# Patient Record
Sex: MALE | Race: OTHER | HISPANIC OR LATINO | URBAN - METROPOLITAN AREA
[De-identification: names, ages, dates, MRNs, and addresses within clinical notes are randomized per-mention and may not be internally consistent; named-entity substitution may affect disease eponyms.]

---

## 2017-04-16 ENCOUNTER — EMERGENCY (EMERGENCY)
Facility: HOSPITAL | Age: 37
LOS: 0 days | Discharge: HOME | End: 2017-04-16
Attending: EMERGENCY MEDICINE | Admitting: EMERGENCY MEDICINE
Payer: MEDICAID

## 2017-04-16 VITALS
OXYGEN SATURATION: 100 % | DIASTOLIC BLOOD PRESSURE: 102 MMHG | HEART RATE: 90 BPM | HEIGHT: 72 IN | RESPIRATION RATE: 17 BRPM | SYSTOLIC BLOOD PRESSURE: 151 MMHG | WEIGHT: 210.1 LBS | TEMPERATURE: 98 F
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
## 2017-04-16 DIAGNOSIS — S89.82XA OTHER SPECIFIED INJURIES OF LEFT LOWER LEG, INITIAL ENCOUNTER: ICD-10-CM

## 2017-04-16 DIAGNOSIS — S61.432A PUNCTURE WOUND WITHOUT FOREIGN BODY OF LEFT HAND, INITIAL ENCOUNTER: ICD-10-CM
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
## 2017-04-16 PROCEDURE — 73130 X-RAY EXAM OF HAND: CPT | Mod: 26,LT

## 2017-04-16 PROCEDURE — 99284 EMERGENCY DEPT VISIT MOD MDM: CPT

## 2017-04-16 RX ORDER — TETANUS TOXOID, REDUCED DIPHTHERIA TOXOID AND ACELLULAR PERTUSSIS VACCINE, ADSORBED 5; 2.5; 8; 8; 2.5 [IU]/.5ML; [IU]/.5ML; UG/.5ML; UG/.5ML; UG/.5ML
0.5 SUSPENSION INTRAMUSCULAR ONCE
Qty: 0 | Refills: 0 | Status: COMPLETED | OUTPATIENT
Start: 2017-04-16 | End: 2017-04-16

## 2017-04-16 RX ADMIN — TETANUS TOXOID, REDUCED DIPHTHERIA TOXOID AND ACELLULAR PERTUSSIS VACCINE, ADSORBED 0.5 MILLILITER(S): 5; 2.5; 8; 8; 2.5 SUSPENSION INTRAMUSCULAR at 10:49

## 2017-04-16 NOTE — ED STATDOCS - PROGRESS NOTE DETAILS
Patient seen and evaluated, no fx or FB on xray, punctura to thenar eminence of L hand.  FROM and strength to hand, sensation intact, brisk capillary refill.  Wound irrigated copiously with NS and betadine, bacitracin and dressing applied, wound care reviewed -Umer Campoverde PA-C

## 2017-04-16 NOTE — ED STATDOCS - DETAILS:
Dr. Keating: I performed the initial face to face bedside interview with this patient regarding history of present illness, review of symptoms and past medical, social and family history.  I completed an independent physical examination.  I was the initial provider who evaluated this patient.  The history, review of symptoms and examination was documented by the scribe in my presence and I attest to the accuracy of the documentation.  I have signed out the follow up of any pending tests (i.e. labs, radiological studies) to the ACP.  I have discussed the patient’s plan of care and disposition with the ACP.  Return to the ER immediately for any worsening symptoms, concerns, chest pain, fevers, shortness of breath, vomiting, abdominal pain, rashes, neck pain, back pain, numbness, paresthesias, pain or any difficulties at all.  Please follow up with your own private physician or our medical clinic at [phone] in the next 2-3 days.  Find a doctor at [phone].

## 2017-04-16 NOTE — ED STATDOCS - CHPI ED SYMPTOM NEG
no blood in stool/no dysuria/no burning urination/no palpitations/no vomiting/no nausea/no diarrhea/no abdominal distention/no fever/no hematuria

## 2017-04-16 NOTE — ED STATDOCS - OBJECTIVE STATEMENT
37 y/o M No previous history or drink etoh, drug use or smoke, presents to the ED s/p left hand injury. The pt provides that he was operating a screw gun and injured the palmar surface of his left hand about 1 hour ago. No h/o foreign body, other trauma, abd pain, nvd, headache, fever, chills, dizziness, cp or cough.  was contacted but was not able to connect, pt was able to give some history.

## 2017-04-16 NOTE — ED STATDOCS - NS ED MD SCRIBE ATTENDING SCRIBE SECTIONS
PROGRESS NOTE/RESULTS/REVIEW OF SYSTEMS/PAST MEDICAL/SURGICAL/SOCIAL HISTORY/DISPOSITION/HISTORY OF PRESENT ILLNESS/HIV/PHYSICAL EXAM/INTAKE ASSESSMENT/SCREENINGS/VITAL SIGNS( Pullset)/CONSULTATIONS/SHIFT CHANGE

## 2023-01-04 ENCOUNTER — EMERGENCY (EMERGENCY)
Facility: HOSPITAL | Age: 43
LOS: 0 days | Discharge: HOME | End: 2023-01-04
Attending: EMERGENCY MEDICINE
Payer: MEDICAID

## 2023-01-04 VITALS
DIASTOLIC BLOOD PRESSURE: 99 MMHG | SYSTOLIC BLOOD PRESSURE: 152 MMHG | RESPIRATION RATE: 18 BRPM | OXYGEN SATURATION: 100 % | TEMPERATURE: 99 F | HEART RATE: 79 BPM

## 2023-01-04 VITALS — HEIGHT: 66 IN | WEIGHT: 199.96 LBS

## 2023-01-04 DIAGNOSIS — K04.7 PERIAPICAL ABSCESS WITHOUT SINUS: ICD-10-CM

## 2023-01-04 DIAGNOSIS — R51.9 HEADACHE, UNSPECIFIED: ICD-10-CM

## 2023-01-04 DIAGNOSIS — R22.0 LOCALIZED SWELLING, MASS AND LUMP, HEAD: ICD-10-CM

## 2023-01-04 PROCEDURE — 99283 EMERGENCY DEPT VISIT LOW MDM: CPT

## 2023-01-04 PROCEDURE — 99284 EMERGENCY DEPT VISIT MOD MDM: CPT

## 2023-01-04 RX ORDER — IBUPROFEN 200 MG
600 TABLET ORAL ONCE
Refills: 0 | Status: COMPLETED | OUTPATIENT
Start: 2023-01-04 | End: 2023-01-04

## 2023-01-04 RX ORDER — IBUPROFEN 200 MG
1 TABLET ORAL
Qty: 16 | Refills: 0
Start: 2023-01-04 | End: 2023-01-07

## 2023-01-04 RX ADMIN — Medication 600 MILLIGRAM(S): at 19:50

## 2023-01-04 RX ADMIN — Medication 1 TABLET(S): at 19:50

## 2023-01-04 NOTE — ED STATDOCS - NS ED ROS FT
Gen: No fever, normal appetite  Eyes: No eye irritation or discharge  ENT: facial swelling   Resp: No cough or trouble breathing  Cardiovascular: No chest pain or palpitation  Gastroenteric: No nausea/vomiting, diarrhea, constipation  :  No change in urine output; no dysuria  MS: No joint or muscle pain  Skin: No rashes  Neuro: No headache; no abnormal movements  Remainder negative, except as per the HPI

## 2023-01-04 NOTE — ED ADULT TRIAGE NOTE - CHIEF COMPLAINT QUOTE
Pt presents to ER c/o right sided dental pain and right sided facial swelling. Pt reports symptoms began  3 days PTA and became exacerbated today. Denies fever/chills

## 2023-01-04 NOTE — ED STATDOCS - PHYSICAL EXAMINATION
Gen: Well appearing in NAD  Head: NC/AT  ENT right upper molar w/ TTP on gumline w/ slight swelling   Neck: trachea midline  Resp:  No distress  Ext: no deformities  Neuro:  A&O appears non focal  Skin:  Warm and dry as visualized  Psych:  Normal affect and mood

## 2023-01-04 NOTE — ED STATDOCS - NS ED ATTENDING STATEMENT MOD
This was a shared visit with the PAM. I reviewed and verified the documentation and independently performed the documented: Attending Only

## 2023-01-04 NOTE — ED STATDOCS - NSFOLLOWUPINSTRUCTIONS_ED_ALL_ED_FT
Dolor dental    Dental Pain       El dolor dental es a menudo un signo de que sucede algo en los dientes o las encías. También es algo que puede ocurrir después de un tratamiento dental. Si tiene dolor dental, es importante que se ponga en contacto con lynch dentista, especialmente si no se ha determinado la causa del dolor. La intensidad del dolor dental puede variar y machelle causas pueden ser muchas, entre ellas, las siguientes:  •Caries. Las caries son causadas por bacterias que producen ácidos que irritan el nervio del diente, volviéndolo sensible al aire y a las temperaturas altas o bajas. [place] con el tiempo provoca molestias o dolor.      •Infección o absceso. Nahid vez que las bacterias llegan a la parte interna del diente (pulpa), puede producirse nahid infección bacteriana (absceso dental). El pus suele acumularse en el extremo de la raíz de un diente.      •Lesiones.      •Nahid grieta en el diente.      •Retracción de las encías que expone la raíz y posiblemente los nervios de un diente.      •Enfermedad en las encías (periodontal).      •Apretar o rechinar los dientes de forma anormal.      •Cuidado deficiente o inadecuado en el hogar.      •Nahid razón desconocida (idiopática).      El dolor puede ser leve o intenso. Puede ocurrir cuando usted:  •[place].      •Está expuesto a temperaturas calientes o frías.      •Come alimentos o massimo bebidas con azúcar, por ejemplo, gaseosas o caramelos.      El dolor puede ser rajan, o puede aparecer y desaparecer sin ninguna causa.      Siga estas instrucciones en lynch casa:    Las siguientes medidas pueden servir para aliviar cualquier molestia que esté sintiendo antes o después de recibir atención dental.    Medicamentos     •[place] los medicamentos de venta sydnee y los recetados solamente khoa se lo haya indicado el dentista.      •Si le recetaron un antibiótico, tómelo khoa se lo haya indicado el dentista. No deje de massimo los antibióticos aunque comience a sentirse mejor.      Comida y bebida     Evite los alimentos o las bebidas que le causen dolor, khoa los siguientes:  •Alimentos o bebidas muy calientes o muy fríos.      •Alimentos o bebidas dulces o con azúcar.        Control del dolor y la hinchazón    •El hielo a veces se puede usar para reducir el dolor y la hinchazón, especialmente si el dolor aparece después de un tratamiento dental.    •Si se lo indican, aplique hielo sobre la janel dolorida de la bianca. Para hacer esto:  •Ponga el hielo en nahid bolsa plástica.      •Coloque nahid toalla entre la piel y la bolsa.      •Aplique el hielo norma 20 minutos, 2 o 3 veces por día.      •Retire el hielo si la piel se pone de color luna brillante. [place] es muy importante. Si no puede sentir dolor, calor o frío, tiene un mayor riesgo de que se dañe la janel.        Cepillarse los dientes    •Para mantener la boca y las encías sanas, cepíllese los dientes dos veces por día con nahid pasta dental con flúor.      •Use nahid pasta dental para dientes sensibles khoa se lo haya indicado el dentista, especialmente si la raíz está expuesta.      •Cepíllese siempre los dientes con un cepillo de cerdas suaves. [place] ayudará a evitar la irritación de las encías.      Instrucciones generales    •Use hilo dental al menos nahid vez al día.      •No se aplique calor en la parte externa de la bianca.      •Brenna gárgaras con nahid mezcla de agua y sal 3 o 4 veces al día, o según sea necesario. Para preparar agua con sal, disuelva totalmente de ½ a 1 cucharadita (de 3 a 6 g) de sal en 1 taza (237 ml) de agua tibia.      •Concurra a todas las visitas de seguimiento. [place] es importante.        Comuníquese con un dentista si:    •Padece algún dolor dental inexplicable.      •El dolor no se ilir con los medicamentos.      •Machelle síntomas empeoran.      •Aparecen nuevos síntomas.        Solicite ayuda de inmediato si:    •No puede abrir la boca.      •Tiene problemas para respirar o tragar.      •Tiene fiebre.      •Observa que tiene hinchazón en la bianca, el aviva o la mandíbula.      Estos síntomas pueden representar un problema grave que constituye nahid emergencia. No espere a chao si los síntomas desaparecen. Solicite atención médica de inmediato. Comuníquese con el servicio de emergencias de lynch localidad (911 en los [place]). No conduzca por machelle propios medios hasta el hospital.       Resumen    •Las causas del dolor dental pueden ser muchas, entre ellas, nahid caries y nahid infección.      •El dolor puede ser leve o intenso.      •[place] los medicamentos de venta sydnee y los recetados solamente khoa se lo haya indicado el dentista.      •Controle el dolor dental a fin de detectar algún cambio. Informe a lynch dentista si los síntomas empeoran.      Esta información no tiene khoa fin reemplazar el consejo del médico. Asegúrese de hacerle al médico cualquier pregunta que tenga.      Document Revised: 10/12/2021 Document Reviewed: 10/12/2021    Elsevier Patient Education © 2022 Elsevier Inc.

## 2023-01-04 NOTE — ED STATDOCS - PATIENT PORTAL LINK FT
You can access the FollowMyHealth Patient Portal offered by [facility] by registering at the following website: [email or web address]. By joining [employer]’s FollowMyHealth portal, you will also be able to view your health information using other applications (apps) compatible with our system.

## 2023-01-04 NOTE — ED STATDOCS - OBJECTIVE STATEMENT
[id number]-  ID     41yo m without significant past medical history p/w right facial swelling and pain x 3 days. no fevers. pt took tylenol w/o relief. has a dentist. can see the patient monday.

## 2023-02-01 NOTE — ED ADULT NURSE NOTE - NS ED NURSE LEVEL OF CONSCIOUSNESS ORIENTATION
Use medications as directed.  Side effects discussed including increased sugars with prednisone, jitteriness, difficult time sleeping.    Increase fluids, rest, warm compresses over sinuses, nasal saline sprays, cool humidifier, Tylenol and ibuprofen over-the-counter as long as no allergies or contraindications.    No antibiotics indicated at this time.  Rapid strep, COVID and influenza today were negative.  Throat culture sent and currently pending.    Symptoms can last 1 to 2 weeks with viral illnesses however he should start noticing some improvement over the next few days.    Go to the emergency department symptoms worsen or change.   Oriented - self; Oriented - place; Oriented - time

## 2023-03-31 ENCOUNTER — EMERGENCY (EMERGENCY)
Facility: HOSPITAL | Age: 43
LOS: 0 days | Discharge: HOME | End: 2023-04-01
Attending: HOSPITALIST
Payer: MEDICAID

## 2023-03-31 VITALS
OXYGEN SATURATION: 98 % | RESPIRATION RATE: 19 BRPM | SYSTOLIC BLOOD PRESSURE: 165 MMHG | HEART RATE: 105 BPM | DIASTOLIC BLOOD PRESSURE: 121 MMHG | TEMPERATURE: 98 F

## 2023-03-31 DIAGNOSIS — S09.90XA UNSPECIFIED INJURY OF HEAD, INITIAL ENCOUNTER: ICD-10-CM

## 2023-03-31 DIAGNOSIS — F10.129 ALCOHOL ABUSE WITH INTOXICATION, UNSPECIFIED: ICD-10-CM
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
## 2023-03-31 DIAGNOSIS — Z20.822 CONTACT WITH AND (SUSPECTED) EXPOSURE TO COVID-19: ICD-10-CM
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
## 2023-03-31 LAB
ALBUMIN SERPL ELPH-MCNC: 4 G/DL — SIGNIFICANT CHANGE UP (ref 3.3–5)
ALP SERPL-CCNC: 103 U/L — SIGNIFICANT CHANGE UP (ref 40–120)
ALT FLD-CCNC: 69 U/L — SIGNIFICANT CHANGE UP (ref 12–78)
ANION GAP SERPL CALC-SCNC: 6 MMOL/L — SIGNIFICANT CHANGE UP (ref 5–17)
APTT BLD: 29.5 SEC — SIGNIFICANT CHANGE UP (ref 27.5–35.5)
AST SERPL-CCNC: 35 U/L — SIGNIFICANT CHANGE UP (ref 15–37)
BASOPHILS # BLD AUTO: 0.08 K/UL — SIGNIFICANT CHANGE UP (ref 0–0.2)
BASOPHILS NFR BLD AUTO: 0.7 % — SIGNIFICANT CHANGE UP (ref 0–2)
BILIRUB SERPL-MCNC: 0.2 MG/DL — SIGNIFICANT CHANGE UP (ref 0.2–1.2)
BUN SERPL-MCNC: 18 MG/DL — SIGNIFICANT CHANGE UP (ref 7–23)
CALCIUM SERPL-MCNC: 8.7 MG/DL — SIGNIFICANT CHANGE UP (ref 8.5–10.1)
CHLORIDE SERPL-SCNC: 107 MMOL/L — SIGNIFICANT CHANGE UP (ref 96–108)
CK SERPL-CCNC: 130 U/L — SIGNIFICANT CHANGE UP (ref 26–308)
CO2 SERPL-SCNC: 28 MMOL/L — SIGNIFICANT CHANGE UP (ref 22–31)
CREAT SERPL-MCNC: 0.83 MG/DL — SIGNIFICANT CHANGE UP (ref 0.5–1.3)
EGFR: 112 ML/MIN/1.73M2 — SIGNIFICANT CHANGE UP
EOSINOPHIL # BLD AUTO: 0.01 K/UL — SIGNIFICANT CHANGE UP (ref 0–0.5)
EOSINOPHIL NFR BLD AUTO: 0.1 % — SIGNIFICANT CHANGE UP (ref 0–6)
ETHANOL SERPL-MCNC: 445 MG/DL — HIGH (ref 0–10)
FLUAV AG NPH QL: SIGNIFICANT CHANGE UP
FLUBV AG NPH QL: SIGNIFICANT CHANGE UP
GLUCOSE BLDC GLUCOMTR-MCNC: 96 MG/DL — SIGNIFICANT CHANGE UP (ref 70–99)
GLUCOSE SERPL-MCNC: 124 MG/DL — HIGH (ref 70–99)
HCT VFR BLD CALC: 42.9 % — SIGNIFICANT CHANGE UP (ref 39–50)
HGB BLD-MCNC: 14.8 G/DL — SIGNIFICANT CHANGE UP (ref 13–17)
IMM GRANULOCYTES NFR BLD AUTO: 0.3 % — SIGNIFICANT CHANGE UP (ref 0–0.9)
INR BLD: 1.05 RATIO — SIGNIFICANT CHANGE UP (ref 0.88–1.16)
LACTATE SERPL-SCNC: 1.9 MMOL/L — SIGNIFICANT CHANGE UP (ref 0.7–2)
LIDOCAIN IGE QN: 179 U/L — SIGNIFICANT CHANGE UP (ref 73–393)
LYMPHOCYTES # BLD AUTO: 27.1 % — SIGNIFICANT CHANGE UP (ref 13–44)
LYMPHOCYTES # BLD AUTO: 3.01 K/UL — SIGNIFICANT CHANGE UP (ref 1–3.3)
MCHC RBC-ENTMCNC: 29.8 PG — SIGNIFICANT CHANGE UP (ref 27–34)
MCHC RBC-ENTMCNC: 34.5 GM/DL — SIGNIFICANT CHANGE UP (ref 32–36)
MCV RBC AUTO: 86.3 FL — SIGNIFICANT CHANGE UP (ref 80–100)
MONOCYTES # BLD AUTO: 0.43 K/UL — SIGNIFICANT CHANGE UP (ref 0–0.9)
MONOCYTES NFR BLD AUTO: 3.9 % — SIGNIFICANT CHANGE UP (ref 2–14)
NEUTROPHILS # BLD AUTO: 7.54 K/UL — HIGH (ref 1.8–7.4)
NEUTROPHILS NFR BLD AUTO: 67.9 % — SIGNIFICANT CHANGE UP (ref 43–77)
PLATELET # BLD AUTO: 343 K/UL — SIGNIFICANT CHANGE UP (ref 150–400)
POTASSIUM SERPL-MCNC: 4 MMOL/L — SIGNIFICANT CHANGE UP (ref 3.5–5.3)
POTASSIUM SERPL-SCNC: 4 MMOL/L — SIGNIFICANT CHANGE UP (ref 3.5–5.3)
PROT SERPL-MCNC: 8.1 GM/DL — SIGNIFICANT CHANGE UP (ref 6–8.3)
PROTHROM AB SERPL-ACNC: 12.2 SEC — SIGNIFICANT CHANGE UP (ref 10.5–13.4)
RBC # BLD: 4.97 M/UL — SIGNIFICANT CHANGE UP (ref 4.2–5.8)
RBC # FLD: 12.9 % — SIGNIFICANT CHANGE UP (ref 10.3–14.5)
RSV RNA NPH QL NAA+NON-PROBE: SIGNIFICANT CHANGE UP
SARS-COV-2 RNA SPEC QL NAA+PROBE: SIGNIFICANT CHANGE UP
SODIUM SERPL-SCNC: 141 MMOL/L — SIGNIFICANT CHANGE UP (ref 135–145)
WBC # BLD: 11.1 K/UL — HIGH (ref 3.8–10.5)
WBC # FLD AUTO: 11.1 K/UL — HIGH (ref 3.8–10.5)

## 2023-03-31 PROCEDURE — 82550 ASSAY OF CK (CPK): CPT

## 2023-03-31 PROCEDURE — 80053 COMPREHEN METABOLIC PANEL: CPT

## 2023-03-31 PROCEDURE — 71260 CT THORAX DX C+: CPT | Mod: 26,MA

## 2023-03-31 PROCEDURE — 74177 CT ABD & PELVIS W/CONTRAST: CPT | Mod: MA

## 2023-03-31 PROCEDURE — 99291 CRITICAL CARE FIRST HOUR: CPT

## 2023-03-31 PROCEDURE — 70450 CT HEAD/BRAIN W/O DYE: CPT | Mod: 26,MA

## 2023-03-31 PROCEDURE — 85610 PROTHROMBIN TIME: CPT

## 2023-03-31 PROCEDURE — 72125 CT NECK SPINE W/O DYE: CPT | Mod: MA

## 2023-03-31 PROCEDURE — 80307 DRUG TEST PRSMV CHEM ANLYZR: CPT

## 2023-03-31 PROCEDURE — 83690 ASSAY OF LIPASE: CPT

## 2023-03-31 PROCEDURE — 71260 CT THORAX DX C+: CPT | Mod: MA

## 2023-03-31 PROCEDURE — 74177 CT ABD & PELVIS W/CONTRAST: CPT | Mod: 26,MA

## 2023-03-31 PROCEDURE — 83605 ASSAY OF LACTIC ACID: CPT

## 2023-03-31 PROCEDURE — 36415 COLL VENOUS BLD VENIPUNCTURE: CPT

## 2023-03-31 PROCEDURE — 93005 ELECTROCARDIOGRAM TRACING: CPT

## 2023-03-31 PROCEDURE — 93010 ELECTROCARDIOGRAM REPORT: CPT

## 2023-03-31 PROCEDURE — 72125 CT NECK SPINE W/O DYE: CPT | Mod: 26,MA

## 2023-03-31 PROCEDURE — 0241U: CPT

## 2023-03-31 PROCEDURE — 99291 CRITICAL CARE FIRST HOUR: CPT | Mod: 25

## 2023-03-31 PROCEDURE — 85025 COMPLETE CBC W/AUTO DIFF WBC: CPT

## 2023-03-31 PROCEDURE — 70450 CT HEAD/BRAIN W/O DYE: CPT | Mod: MA

## 2023-03-31 PROCEDURE — 85730 THROMBOPLASTIN TIME PARTIAL: CPT

## 2023-03-31 PROCEDURE — 82962 GLUCOSE BLOOD TEST: CPT

## 2023-03-31 RX ORDER — SODIUM CHLORIDE 9 MG/ML
1000 INJECTION INTRAMUSCULAR; INTRAVENOUS; SUBCUTANEOUS ONCE
Refills: 0 | Status: COMPLETED | OUTPATIENT
Start: 2023-03-31 | End: 2023-03-31

## 2023-03-31 RX ADMIN — SODIUM CHLORIDE 1000 MILLILITER(S): 9 INJECTION INTRAMUSCULAR; INTRAVENOUS; SUBCUTANEOUS at 23:11

## 2023-03-31 NOTE — ED PROVIDER NOTE - NS_ ATTENDINGSCRIBEDETAILS _ED_A_ED_FT
Lizbeth Vizcaino MD: The history, relevant review of systems, past medical and surgical history, medical decision making, and physical examination was documented by the scribe in my presence and I attest to the accuracy of the documentation.

## 2023-03-31 NOTE — ED ADULT NURSE NOTE - OBJECTIVE STATEMENT
Pt A&Ox4, VSS. BIBA from home s/p mechanical fall; pt states he had been drinking earlier in the night and fell backwards down stairs while trying to go up. Pt denies LOC, c/o pain to lower back and hips, as well as neck. On assessment weakness to RLE and RUE noted, pulses palpable. No bleeding or open wounds noted. Cervical collar in place, pt transported to CT, labs collected, PIV placed. Pt's wife at bedside. Disposition pending.

## 2023-03-31 NOTE — ED PROVIDER NOTE - CLINICAL SUMMARY MEDICAL DECISION MAKING FREE TEXT BOX
41 yo s/p fall while intoxicated trauma alert activated will obtain ct imaging labs. 43 yo s/p fall while intoxicated trauma alert activated will obtain ct imaging & labs. labs and ct imaging noted. all negative for acute findings other than elevated alcohol level as expected. now requesting pain medication, toradol ordered. 41 yo s/p fall while intoxicated trauma alert activated will obtain ct imaging & labs. labs and ct imaging noted. all negative for acute findings other than elevated alcohol level as expected. now requesting pain medication for generalized body pains, toradol ordered. patient is ambulatory. wife at bedside comfortable taking patient home. will dc.

## 2023-03-31 NOTE — ED ADULT TRIAGE NOTE - CHIEF COMPLAINT QUOTE
TA per Dr. Vizcaino, intox fell backwards down 6/7 steps, hit head, no bleeding, has lump on head. complaining of back pain and hip pain, wife at bedside

## 2023-03-31 NOTE — ED PROVIDER NOTE - CRITICAL CARE ATTENDING CONTRIBUTION TO CARE
Elements for critical care include direct patient care (not related to procedure), additional history taking, interpretation of diagnostic studies, documentation,, consult w/ pt's family directly relating to pts condition

## 2023-03-31 NOTE — ED PROVIDER NOTE - OBJECTIVE STATEMENT
42 no PMHx s/p fall backwards down stairs at home, down about 6 steps hitting back of his head on the front door, landed back on floor legs on stairs. Pt was intoxicated at time of fall, no LOC. Wife at bedside witnessed fall called 911 pt arrived in a c-collar, no known blood thinners,, trauma alert activated.

## 2023-03-31 NOTE — ED PROVIDER NOTE - PATIENT PORTAL LINK FT
You can access the FollowMyHealth Patient Portal offered by [facility] by registering at the following website: [email or web address]. By joining [employer]’s FollowMyHealth portal, you will also be able to view your health information using other applications (apps) compatible with our system.

## 2023-03-31 NOTE — ED PROVIDER NOTE - NSFOLLOWUPINSTRUCTIONS_ED_ALL_ED_FT
please avoid alcohol use.   Take tylenol as needed for pain, 650Mg every 6-8 hours.  You can also take ibuprofen as needed for pain, 600mg every 6-8 hours, take with food.

## 2023-04-01 VITALS
OXYGEN SATURATION: 100 % | HEART RATE: 88 BPM | SYSTOLIC BLOOD PRESSURE: 106 MMHG | DIASTOLIC BLOOD PRESSURE: 67 MMHG | RESPIRATION RATE: 16 BRPM | TEMPERATURE: 98 F

## 2023-04-01 RX ORDER — KETOROLAC TROMETHAMINE 30 MG/ML
30 SYRINGE (ML) INJECTION ONCE
Refills: 0 | Status: COMPLETED | OUTPATIENT
Start: 2023-04-01 | End: 2023-04-01

## 2025-02-03 ENCOUNTER — EMERGENCY (EMERGENCY)
Facility: HOSPITAL | Age: 45
LOS: 0 days | Discharge: HOME | End: 2025-02-03
Attending: EMERGENCY MEDICINE
Payer: COMMERCIAL

## 2025-02-03 VITALS — HEIGHT: 67 IN | WEIGHT: 214.95 LBS

## 2025-02-03 VITALS
SYSTOLIC BLOOD PRESSURE: 145 MMHG | DIASTOLIC BLOOD PRESSURE: 103 MMHG | OXYGEN SATURATION: 100 % | RESPIRATION RATE: 18 BRPM | TEMPERATURE: 98 F | HEART RATE: 72 BPM

## 2025-02-03 DIAGNOSIS — L84 CORNS AND CALLOSITIES: ICD-10-CM

## 2025-02-03 DIAGNOSIS — M79.642 PAIN IN LEFT HAND: ICD-10-CM

## 2025-02-03 PROCEDURE — 99283 EMERGENCY DEPT VISIT LOW MDM: CPT | Mod: 25

## 2025-02-03 PROCEDURE — 99284 EMERGENCY DEPT VISIT MOD MDM: CPT

## 2025-02-03 PROCEDURE — 73130 X-RAY EXAM OF HAND: CPT | Mod: 26,LT

## 2025-02-03 PROCEDURE — 73130 X-RAY EXAM OF HAND: CPT | Mod: LT

## 2025-02-03 RX ORDER — IBUPROFEN 600 MG/1
600 TABLET, FILM COATED ORAL ONCE
Refills: 0 | Status: COMPLETED | OUTPATIENT
Start: 2025-02-03 | End: 2025-02-03

## 2025-02-03 RX ADMIN — IBUPROFEN 600 MILLIGRAM(S): 600 TABLET, FILM COATED ORAL at 16:41

## 2025-02-03 NOTE — ED STATDOCS - PHYSICAL EXAMINATION
Constitutional: NAD AAOx3  Eyes: PERRLA EOMI  Head: Normocephalic atraumatic  Mouth: MMM  Cardiac: regular rate   Resp: unlabored breathing  GI: Abd s/nt/nd  Neuro: grossly normal and intact  Skin: No visible rashes  MSK: Left hand with hardening middle of the palm. Distal motor vascular intact fingers. Constitutional: NAD AAOx3  Eyes: PERRLA EOMI  Head: Normocephalic atraumatic  Mouth: MMM  Cardiac: regular rate   Resp: unlabored breathing  GI: Abd s/nt/nd  Neuro: grossly normal and intact  Skin: No visible rashes  MSK: Left hand with hardening middle of the palm. Distal motor vascular intact fingers.  no redness, no drainage

## 2025-02-03 NOTE — ED STATDOCS - NSFOLLOWUPINSTRUCTIONS_ED_ALL_ED_FT
Dolor de la mano  Hand Pain  El dolor de la mano puede dificultar las actividades cotidianas. El dolor de la mano puede tener muchas causas. Algunas causas frecuentes son las siguientes:  Lesiones. Pueden incluir:  Rotura de huesos (fracturas)y wallace.  Lesiones por uso excesivo a causa de hacer los mismos movimientos muchas veces (actividad repetitiva).  Artritis.  Bultos en los tendones o las articulaciones de la mano y la josue (quistes ganglionares).  Síndromes de compresión nerviosa (síndrome del túnel carpiano).  Inflamación de los tendones (tendinitis).  Infección.  Siga estas instrucciones en lynch casa:  Control del dolor, la rigidez y la hinchazón    A bag of ice on a towel on the skin.  A heating pad for use on the affected area.   Use los medicamentos de venta sydnee y los recetados solamente khoa se lo haya indicado el médico.  Si se lo indican, aplique hielo en la janel afectada.  Ponga el hielo en nahid bolsa plástica.  Coloque nahid toalla entre la piel y la bolsa.  Aplique el hielo norma 20 minutos, 2 a 3 veces por día.  Si se lo indican, aplique calor en la janel afectada antes de hacer ejercicios o tan frecuentemente khoa se lo haya indicado el médico. Use la karin de calor que el médico le recomiende, khoa nahid compresa de calor húmedo o nahid almohadilla térmica.  Coloque nahid toalla entre la piel y la karin de calor.  Aplique calor norma 20 a 30 minutos.  Remove the heat if your skin turns bright red. This is especially important if you are unable to feel pain, heat, or cold. You have a greater risk of getting burned.  Si la piel se le pone de color luna brillante, retire el hielo o el calor de inmediato para evitar daños en la piel. El riesgo de daño es mayor si no puede sentir dolor, calor o frío.  Actividad    [place] descansos frecuentes cuando realiza actividades repetidas.  Reduzca al mínimo la carga sobre las catalina y las muñecas tanto khoa sea posible.  Jeremy estiramientos o ejercicios khoa se lo haya indicado el médico.  No jeremy las actividades que intensifican el dolor.  Use nahid férula o un soporte para la mano khoa se lo haya indicado el médico.  Comuníquese con un médico si:  El dolor no mejora después de unos días.  El dolor empeora.  El dolor afecta lynch capacidad de realizar las actividades cotidianas.  La herida está lucy, hinchada o caliente.  La mano se le adormece o tiene hormigueo.  Solicite ayuda de inmediato si:  La mano está extremadamente hinchada o tiene nahid forma inusual.  La mano o los dedos se tornan de color browning o rocael.  No puede  la mano, la josue o los dedos.

## 2025-02-03 NOTE — ED STATDOCS - PROGRESS NOTE DETAILS
44-year-old male with no significant past medical history presents with family for left hand pain for 6 months.  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  Patient states that he is right-hand dominant.  Patient states that has been getting worse over the last few days and feels that when he tries to extend his fingers he feels some difficulty in the area.  Callus noted at the volar aspect of the left third MCP and distal left third mid palm.  No decreased range of motion.  Nontender to palpation.  Will check x-ray, meds and likely splint and discharged to follow-up with Ortho. -Franklin Hough PA-C Using  [id number], informed patient of his x-ray results.  X-ray unremarkable for any bony involvement.  Patient likely has a catalyst causing him symptoms.  Recommended to take Advil/ibuprofen 600 mg every 6 hours for pain and will place into a splint to have patient follow-up with Ortho as outpatient.  Patient is aware and agree with plan. -Franklin Hough PA-C

## 2025-02-03 NOTE — ED STATDOCS - NSFOLLOWUPCLINICS_GEN_ALL_ED_FT
[facility] [place] [id number] [place] [address]  Phone: [phone]  Fax:

## 2025-02-03 NOTE — ED STATDOCS - PATIENT PORTAL LINK FT
You can access the FollowMyHealth Patient Portal offered by  by registering at the following website: [email or web address]. By joining [employer]’s FollowMyHealth portal, you will also be able to view your health information using other applications (apps) compatible with our system.

## 2025-02-06 PROBLEM — Z00.00 ENCOUNTER FOR PREVENTIVE HEALTH EXAMINATION: Status: ACTIVE | Noted: 2025-02-06

## 2025-02-11 ENCOUNTER — APPOINTMENT (OUTPATIENT)
Dept: ORTHOPEDIC SURGERY | Facility: CLINIC | Age: 45
End: 2025-02-11